# Patient Record
Sex: MALE | Race: WHITE | URBAN - METROPOLITAN AREA
[De-identification: names, ages, dates, MRNs, and addresses within clinical notes are randomized per-mention and may not be internally consistent; named-entity substitution may affect disease eponyms.]

---

## 2018-05-16 ENCOUNTER — OFFICE VISIT (OUTPATIENT)
Dept: FAMILY MEDICINE | Facility: OTHER | Age: 58
End: 2018-05-16
Payer: COMMERCIAL

## 2018-05-16 VITALS
BODY MASS INDEX: 23.28 KG/M2 | RESPIRATION RATE: 18 BRPM | SYSTOLIC BLOOD PRESSURE: 110 MMHG | HEIGHT: 69 IN | HEART RATE: 56 BPM | DIASTOLIC BLOOD PRESSURE: 64 MMHG | TEMPERATURE: 98.5 F | WEIGHT: 157.2 LBS
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
## 2018-05-16 PROCEDURE — 99213 OFFICE O/P EST LOW 20 MIN: CPT | Performed by: FAMILY MEDICINE

## 2018-05-16 ASSESSMENT — PAIN SCALES - GENERAL: PAINLEVEL: NO PAIN (0)

## 2018-05-16 NOTE — PROGRESS NOTES
"  SUBJECTIVE:   Larry Franke is a 58 year old male who presents to clinic today for the following health issues:      HPI  Rash    Onset: 05/13 and 05/15    Description:   Location: left side of chest and right top of butt cheek.  Character: round, red  Itching (Pruritis): YES    Progression of Symptoms:  worsening    Accompanying Signs & Symptoms:  Fever: no   Body aches or joint pain: no   Sore throat symptoms: no   Recent cold symptoms: no     History:   Previous similar rash: no     Precipitating factors:     Patient pulled off what he states was a deer tick in each location.    Alleviating factors:  none    Therapies Tried and outcome: Antiseptic and a bandage.     Bit by a few ticks.  He's confident they were deer ticks.  Removed one Sunday and one last night.    Denies fevers, joint pains, but does have a rash.      Problem list and histories reviewed & adjusted, as indicated.  Additional history: as documented      Current Outpatient Prescriptions   Medication Sig Dispense Refill     UNKNOWN TO PATIENT Cholesterol       Recent Labs   Lab Test  12/23/10   1700   CR  0.90   GFRESTIMATED  90   GFRESTBLACK  >90   POTASSIUM  4.2      BP Readings from Last 3 Encounters:   05/16/18 110/64   12/23/10 115/63    Wt Readings from Last 3 Encounters:   05/16/18 157 lb 3.2 oz (71.3 kg)                    ROS:  Constitutional, HEENT, cardiovascular, pulmonary, gi and gu systems are negative, except as otherwise noted.    OBJECTIVE:     /64 (Cuff Size: Adult Regular)  Pulse 56  Temp 98.5  F (36.9  C) (Temporal)  Resp 18  Ht 5' 9.05\" (1.754 m)  Wt 157 lb 3.2 oz (71.3 kg)  BMI 23.18 kg/m2  Body mass index is 23.18 kg/(m^2).  GENERAL: healthy, alert and no distress  MS: no gross musculoskeletal defects noted, no edema  SKIN: 2 insect bites with surrounding erythema.  This does not appear consistent with cellulitis, but just local inflammation secondary to the bites.  There is a slight amount of necrosis in the " middle of these lesions.    Diagnostic Test Results:  Results for orders placed or performed during the hospital encounter of 12/23/10   CBC with platelets differential   Result Value Ref Range    WBC 22.1 (H) 4.0 - 11.0 10e9/L    RBC Count 5.33 4.4 - 5.9 10e12/L    Hemoglobin 15.1 13.3 - 17.7 g/dL    Hematocrit 45.8 40.0 - 53.0 %    MCV 86 78 - 100 fl    MCH 28.3 26.5 - 33.0 pg    MCHC 33.0 31.5 - 36.5 g/dL    RDW 12.7 10.0 - 15.0 %    Platelet Count 241 150 - 450 10e9/L    Diff Method Automated Method     % Neutrophils 87 (H) 40 - 75 %    % Lymphocytes 4 (L) 20 - 48 %    % Monocytes 9 0 - 12 %    Absolute Neutrophil 19.2 (H) 1.6 - 8.3 10e9/L    Absolute Lymphocytes 0.9 0.8 - 5.3 10e9/L    Absolute Monocytes 2.0 (H) 0.0 - 1.3 10e9/L   Basic metabolic panel   Result Value Ref Range    Sodium 140 133 - 144 mmol/L    Potassium 4.2 3.4 - 5.3 mmol/L    Chloride 104 94 - 109 mmol/L    Carbon Dioxide 25 20 - 32 mmol/L    Anion Gap 11 6 - 17 mmol/L    Glucose 181 (H) 60 - 99 mg/dL    Urea Nitrogen 23 5 - 24 mg/dL    Creatinine 0.90 0.66 - 1.25 mg/dL    GFR Estimate 90 >60 mL/min/1.7m2    GFR Estimate If Black >90 >60 mL/min/1.7m2    Calcium 9.1 8.5 - 10.4 mg/dL   INR   Result Value Ref Range    INR 1.12 0.86 - 1.14   Partial thromboplastin time   Result Value Ref Range    PTT 27 22 - 37 sec   Alcohol ethyl   Result Value Ref Range    Ethanol g/dL <0.01 0.01 g/dL       ASSESSMENT/PLAN:       ICD-10-CM    1. Tick bite, initial encounter W57.XXXA      Discuss concerns and risks with tick bites.  It sounds like these takes were not attached for more than 24 hours.  Antibiotic prophylaxis is not indicated at this time.  There is also no evidence of cellulitis at this time.  Will not prescribe antibiotics.  Continue to monitor these.  If they do not improve, we could consider further interventions.  Given the itching that he reports, can use hydrocortisone cream and/or antihistamines to help.  Discussed signs of infection or  Lyme disease.  Follow-up as needed in 1-4 weeks.    Portions of this note were completed using Dragon dictation software.  Although reviewed, there may be typographical and other inadvertent errors that remain.               Patient Instructions   Thank you for visiting [facility] [place] to use hydrocortisone cream if needed for itching.    If your red areas are getting larger, more tender or more warm, let me know, and I'll send in antibiotics for skin infection.      If fevers, joint pains, or a more typical target rash, then we should evaluate for Lyme disease.    Please Follow Up when indicated on the section below this one on your After-Visit Summary.      If you had imaging scheduled please refer to your radiology prep sheet.    Appointment    Date_______________     Time_____________    Day:   M TU W TH F    With____________________________    Location_________________________    If you need medication refills, please contact your pharmacy 3 days before your prescriptions runs out. If you are out of refills, your pharmacy will contact contact the clinic.    Contact us or return if questions or concerns.     -Your Care Team:  MD Krys Jonas PA-C Joel De Haan, PA-C Elizabeth McLean, APRN CNP    General information about your clinic      Clinic hours:     Lab hours:  Phone [phone]  Monday 7:30 am-7 pm    Monday 8:30 am-6:30 pm  Tuesday-Friday 7:30 am-5 pm   Tuesday-Friday 8:30 am-4:30 pm    Pharmacy hours:  Phone [phone]  Monday 8:30 am-7pm  Tuesday-Friday 8:30am-6 pm                                       Mychart assistance [phone]        We would like to hear from you, how was your visit today?    Radha Hairston  Patient Information Supervisor   Patient Care Supervisor  [facility] [place], and [facility] [place], and [facility]  [phone]         Logan Olivas MD,  MD  [facility]

## 2018-05-16 NOTE — MR AVS SNAPSHOT
After Visit Summary   5/16/2018    Larry Franke    MRN: [id number]           Patient Information     Date Of Birth          1960        Visit Information        Provider Department      5/16/2018 1:00 PM Logan Olivas MD [facility]        Today's Diagnoses     Tick bite, initial encounter    -  1      Care Instructions    Thank you for visiting [facility] to use hydrocortisone cream if needed for itching.    If your red areas are getting larger, more tender or more warm, let me know, and I'll send in antibiotics for skin infection.      If fevers, joint pains, or a more typical target rash, then we should evaluate for Lyme disease.    Please Follow Up when indicated on the section below this one on your After-Visit Summary.      If you had imaging scheduled please refer to your radiology prep sheet.    Appointment    Date_______________     Time_____________    Day:   M TU W TH F    With____________________________    Location_________________________    If you need medication refills, please contact your pharmacy 3 days before your prescriptions runs out. If you are out of refills, your pharmacy will contact contact the clinic.    Contact us or return if questions or concerns.     -Your Care Team:  MD Krys Jonas PA-C Joel De Haan, PA-C Elizabeth McLean, APRN CNP    General information about your clinic      Clinic hours:     Lab hours:  Phone [phone]  Monday 7:30 am-7 pm    Monday 8:30 am-6:30 pm  Tuesday-Friday 7:30 am-5 pm   Tuesday-Friday 8:30 am-4:30 pm    Pharmacy hours:  Phone [phone]  Monday 8:30 am-7pm  Tuesday-Friday 8:30am-6 pm                                       Mychart assistance [phone]        We would like to hear from you, how was your visit today?    Radha Hairston  Patient Information Supervisor   Patient Care Supervisor  [place], and [place]  "[facility], [place], [facility]  [phone]             Follow-ups after your visit        Your next 10 appointments already scheduled     May 16, 2018  1:00 PM CDT   SHORT with Logan Olivas MD   [facility]    [address] [facility] [address]  [place] [address] [phone]              Who to contact     If you have questions or need follow up information about today's clinic visit or your schedule please contact [facility] directly at [phone].  Normal or non-critical lab and imaging results will be communicated to you by MyChart, letter or phone within 4 business days after the clinic has received the results. If you do not hear from us within 7 days, please contact the clinic through [employer] or phone. If you have a critical or abnormal lab result, we will notify you by phone as soon as possible.  Submit refill requests through [employer] or call your pharmacy and they will forward the refill request to us. Please allow 3 business days for your refill to be completed.          Additional Information About Your Visit        [email or web address] Information     [employer] lets you send messages to your doctor, view your test results, renew your prescriptions, schedule appointments and more. To sign up, go to [email or web address] . Click on \"Log in\" on the left side of the screen, which will take you to the Welcome page. Then click on \"Sign up Now\" on the right side of the page.     You will be asked to enter the access code listed below, as well as some personal information. Please follow the directions to create your username and password.     Your access code is: [email or web address]  Expires: 2018 12:41 PM     Your access code will  in 90 days. If you need help or a new code, please call your [facility] or [phone].        Care EveryWhere ID     This is your Care EveryWhere ID. This could be used by other " "organizations to access your [place] medical records  [id number]        Your Vitals Were     Pulse Temperature Respirations Height BMI (Body Mass Index)       56 98.5  F (36.9  C) (Temporal) 18 5' 9.05\" (1.754 m) 23.18 kg/m2        Blood Pressure from Last 3 Encounters:   05/16/18 110/64   12/23/10 115/63    Weight from Last 3 Encounters:   05/16/18 157 lb 3.2 oz (71.3 kg)              Today, you had the following     No orders found for display       Primary Care Provider Fax #    Physician No Ref-Primary [phone]       No address on file        Equal Access to Services     [facility]: Hadii arleth Peters, gregor urrutia, panchito kaalmarivas [place], luis miguel morse . So [facility] [phone].    ATENCIÓN: Si habla español, tiene a johnson disposición servicios gratuitos de asistencia lingüística. Llame al [phone].    We comply with applicable federal civil rights laws and [place] laws. We do not discriminate on the basis of race, color, national origin, age, disability, sex, sexual orientation, or gender identity.            Thank you!     Thank you for choosing [facility]  for your care. Our goal is always to provide you with excellent care. Hearing back from our patients is one way we can continue to improve our services. Please take a few minutes to complete the written survey that you may receive in the mail after your visit with us. Thank you!             Your Updated Medication List - Protect others around you: Learn how to safely use, store and throw away your medicines at www.disposemymeds.org.          This list is accurate as of 5/16/18 12:41 PM.  Always use your most recent med list.                   Brand Name Dispense Instructions for use Diagnosis    UNKNOWN TO PATIENT      Cholesterol          "

## 2018-05-16 NOTE — PATIENT INSTRUCTIONS
Thank you for visiting [facility] to use hydrocortisone cream if needed for itching.    If your red areas are getting larger, more tender or more warm, let me know, and I'll send in antibiotics for skin infection.      If fevers, joint pains, or a more typical target rash, then we should evaluate for Lyme disease.    Please Follow Up when indicated on the section below this one on your After-Visit Summary.      If you had imaging scheduled please refer to your radiology prep sheet.    Appointment    Date_______________     Time_____________    Day:   M TU W TH F    With____________________________    Location_________________________    If you need medication refills, please contact your pharmacy 3 days before your prescriptions runs out. If you are out of refills, your pharmacy will contact contact the clinic.    Contact us or return if questions or concerns.     -Your Care Team:  MD Krys Jonas PA-C Joel De Haan, PA-C Elizabeth McLean, APRN CNP    General information about your clinic      Clinic hours:     Lab hours:  Phone [phone]  Monday 7:30 am-7 pm    Monday 8:30 am-6:30 pm  Tuesday-Friday 7:30 am-5 pm   Tuesday-Friday 8:30 am-4:30 pm    Pharmacy hours:  Phone [phone]  Monday 8:30 am-7pm  Tuesday-Friday 8:30am-6 pm                                       Mychart assistance [phone]        We would like to hear from you, how was your visit today?    Radha Hairston  Patient Information Supervisor   Patient Care Supervisor  [facility], and [facility], and [facility]  [phone]

## 2018-12-12 ENCOUNTER — TELEPHONE (OUTPATIENT)
Dept: FAMILY MEDICINE | Facility: OTHER | Age: 58
End: 2018-12-12

## 2018-12-12 NOTE — TELEPHONE ENCOUNTER
Panel Management Review      Patient has the following on his problem list: None      Composite cancer screening  Chart review shows that this patient is due/due soon for the following Colonoscopy  Summary:    Patient is due/failing the following:   COLONOSCOPY    Action needed:   Schedule a colonoscopy or complete a FIT test     Type of outreach:    Phone, spoke to patient.  patients primary clinic is [facility]     Questions for provider review:    None                                                                                                                                      Jaclyn Newton       Chart routed to Care Team .